# Patient Record
Sex: MALE | Race: WHITE | NOT HISPANIC OR LATINO | Employment: OTHER | URBAN - METROPOLITAN AREA
[De-identification: names, ages, dates, MRNs, and addresses within clinical notes are randomized per-mention and may not be internally consistent; named-entity substitution may affect disease eponyms.]

---

## 2021-11-17 ENCOUNTER — TELEMEDICINE (OUTPATIENT)
Dept: FAMILY MEDICINE CLINIC | Facility: TELEHEALTH | Age: 31
End: 2021-11-17

## 2021-11-17 VITALS — OXYGEN SATURATION: 94 % | WEIGHT: 267 LBS | HEIGHT: 73 IN | BODY MASS INDEX: 35.39 KG/M2

## 2021-11-17 DIAGNOSIS — U07.1 COVID-19: Primary | ICD-10-CM

## 2021-11-17 PROBLEM — S06.9XAA TRAUMATIC BRAIN INJURY (HCC): Status: ACTIVE | Noted: 2021-11-17

## 2021-11-17 PROBLEM — H91.90 HEARING LOSS: Status: ACTIVE | Noted: 2021-11-17

## 2021-11-17 PROBLEM — H54.7 VISUAL IMPAIRMENT: Status: ACTIVE | Noted: 2021-11-17

## 2021-11-17 PROCEDURE — 99203 OFFICE O/P NEW LOW 30 MIN: CPT | Performed by: NURSE PRACTITIONER

## 2021-11-17 RX ORDER — BUPRENORPHINE HYDROCHLORIDE 8 MG/1
TABLET SUBLINGUAL
COMMUNITY
Start: 2018-10-17

## 2021-11-17 NOTE — PATIENT INSTRUCTIONS
I know you have had a positive COVID-19 test, but will need a positive PCR test for the monoclonal antibodies.   I have included a COVID-19 test. Go to your nearest [facility] for testing. Tell them you have already been seen virtually and only need testing   We will notify you of your COVID-19 results by phone. You will receive a call from an unknown or blocked number. You can also get your results on your [employer].  You will need to remain quarantined for 10 days from onset of symptoms and only to discontinue if symptoms have improved and no fever for 24 hours without the use of fever reducing medications such as Tylenol (acetaminophen), Advil (ibuprfen), or Aleve (naproxen).  Continue to wear a mask for 14 days or until symptoms resolve. If symptoms worsen, go to Urgent Care or Emergency Department for care.   Symptoms of Coronavirus are treated just as you would for any viral illness. Take Tylenol and/or Ibuprofen for pain and/or fever, you may find it better to alternate these every 4 hours, so that you are only taking each every 8 hours. Stay hydrated, rest and you may treat cough with over-the-counter cough syrup such as Robitussin.  If your symptoms do not improve, symptoms change or do not fully resolve, seek further evaluation with your primary care provider or Urgent Care. If you develop severe symptoms, such as chest pain, high fever, difficulty breathing, difficulty urinating or have any symptoms that interfere with your ability to function go to the nearest Emergency Department immediately.   3 Key Steps to Take While Waiting for Your COVID-19 Test Result  To help stop the spread of COVID-19, take these 3 key steps NOW while waiting for your test results:  1. Stay home and monitor your health.  Stay home and monitor your health to help protect your friends, family, and others from possibly getting COVID-19 from you.  Stay home and away from others:  · If possible, stay away from  others, especially people who are at higher risk for getting very sick from COVID-19, such as older adults and people with other medical conditions.  · If you have been in contact with someone with COVID-19, stay home and away from others for 14 days after your last contact with that person. Follow the recommendations of your local public health department if you need to quarantine.  · If you have a fever, cough or other symptoms of COVID-19, stay home and away from others (except to get medical care).  Monitor your health:  · Watch for fever, cough, shortness of breath, or other symptoms of COVID-19. Remember, symptoms may appear 2-14 days after exposure to COVID-19 and can include:  ? Fever or chills  ? Cough  ? Shortness of breath or difficulty breathing  ? Tiredness  ? Muscle or body aches  ? Headache  ? New loss of taste or smell  ? Sore throat  ? Congestion or runny nose  ? Nausea or vomiting  ? Diarrhea  2. Think about the people you have recently been around.  If you are diagnosed with COVID-19, a public health worker may call you to check on your health, discuss who you have been around, and ask where you spent time while you may have been able to spread COVID-19 to others. While you wait for your COVID-19 test result, think about everyone you have been around recently. This will be important information to give health workers if your test is positive.   Complete the information on the back of this page to help you remember everyone you have been around.   3. Answer the phone call from the health department.  If a public health worker calls you, answer the call to help slow the spread of COVID-19 in your community.  · Discussions with health department staff are confidential. This means that your personal and medical information will be kept private and only shared with those who may need to know, like your health care provider.  · Your name will not be shared with those you came in contact with. The health  department will only notify people you were in close contact with (within 6 feet for more than 15 minutes) that they might have been exposed to COVID-19.  Think about the people you have recently been around  If you test positive and are diagnosed with COVID-19, someone from the health department may call to check-in on your health, discuss who you have been around, and ask where you spent time while you may have been able to spread COVID-19 to others. This form can help you think about people you have recently been around so you will be ready if a public health worker calls you.  Things to think about. Have you:  · Gone to work or school?  · Gotten together with others (eaten out at a restaurant, gone out for drinks, exercised with others or gone to a gym, had friends or family over to your house, volunteered, gone to a party, pool, or park)?  · Gone to a store in person (e.g., grocery store, mall)?  · Gone to in-person appointments (e.g., salon, russo, doctor's or dentist's office)?  · Ridden in a car with others (e.g., [employer]) or taken public transportation?  · Been inside a Hinduism, Baptism, Anabaptist or other places of Spiritism?  Who lives with you?  · ______________________________________________________________________  · ______________________________________________________________________  · ______________________________________________________________________  · ______________________________________________________________________  Who have you been around (less than 6 feet for a total of 15 minutes or more) in the last 10 days? (You may have more people to list than the space provided. If so, write on the front of this sheet or a separate piece of paper.)  Name ______________________________________________  · Phone number ____________________________________  · Date you last saw them _____________________________  · Where you last saw them ________________________________________________  Name  ______________________________________________  · Phone number ____________________________________  · Date you last saw them _____________________________  · Where you last saw them ________________________________________________  Name ______________________________________________  · Phone number ____________________________________  · Date you last saw them _____________________________  · Where you last saw them ________________________________________________  Name ______________________________________________  · Phone number ____________________________________  · Date you last saw them _____________________________  · Where you last saw them ________________________________________________  Name ______________________________________________  · Phone number ____________________________________  · Date you last saw them _____________________________  · Where you last saw them ________________________________________________  What have you done in the last 10 days with other people?  Activity _____________________________________________  · Location _________________________________________  · Date ____________________________________________  Activity _____________________________________________  · Location _________________________________________  · Date ____________________________________________  Activity _____________________________________________  · Location _________________________________________  · Date ____________________________________________  Activity _____________________________________________  · Location _________________________________________  · Date ____________________________________________  Activity _____________________________________________  · Location _________________________________________  · Date ____________________________________________  Centers for Disease Control and Prevention  cdc.gov/coronavirus  07/09/2021  This information is not intended to replace advice  given to you by your health care provider. Make sure you discuss any questions you have with your health care provider.  Document Revised: 07/14/2021 Document Reviewed: 07/14/2021  Elsevier Patient Education © 2021 [employer]

## 2021-11-17 NOTE — PROGRESS NOTES
"You have chosen to receive care through a telehealth visit.  Do you consent to use a video/audio connection for your medical care today? Yes     CHIEF COMPLAINT  Chief Complaint   Patient presents with   • Cough         HPI  Olayinka Matias is a 31 y.o. male  presents with complaint of positive COVID-19 symptoms and requesting monoclonal antibody infusion. He has several members of his family with symptoms. His wife is giving me the history with his permission.   Symptoms started on Monday 11/15.      Review of Systems   Constitutional: Positive for fatigue.   HENT: Positive for congestion, rhinorrhea, sinus pressure and sneezing.    Respiratory: Positive for cough and shortness of breath (mild). Negative for wheezing.    Cardiovascular: Positive for chest pain (soreness from coughing).   Gastrointestinal: Positive for nausea and vomiting. Negative for diarrhea.   Musculoskeletal: Positive for myalgias.   Neurological: Positive for headaches.       Past Medical History:   Diagnosis Date   • Obesity        No family history on file.    Social History     Socioeconomic History   • Marital status:    Tobacco Use   • Smoking status: Former Smoker   • Smokeless tobacco: Never Used   Vaping Use   • Vaping Use: Some days   • Substances: Nicotine         Ht 185.4 cm (73\")   Wt 121 kg (267 lb)   SpO2 94%   BMI 35.23 kg/m²     PHYSICAL EXAM  Physical Exam   Constitutional: He is oriented to person, place, and time. He appears well-developed and well-nourished. He does not have a sickly appearance. He does not appear ill. No distress.   HENT:   Head: Normocephalic and atraumatic.   Eyes: EOM are normal.   Neck: Neck normal appearance.  Pulmonary/Chest: Effort normal.  No respiratory distress.  Neurological: He is alert and oriented to person, place, and time.   Skin: Skin is dry.   Psychiatric: He has a normal mood and affect.       No results found for this or any previous visit.    Diagnoses and all orders for this " visit:    1. COVID-19 (Primary)  -     COVID-19,[facility] ROUTINE, NP/OP SWAB IN TRANSPORT MEDIA OR ESWAB 72 HR TAT - Swab, Nasopharynx; Future  -     QUESTIONNAIRE SERIES  -     QUESTIONNAIRE SERIES    Called wife for copy of test and he has not had pcr test. His mother did a home test on him. Will schedule COVID-19 pcr test for today.   He would like to be set up for monoclonal antibodies if pcr test is positive.     FOLLOW-UP  As discussed during visit with PCP/[facility] if no improvement or Urgent Care/Emergency Department if worsening of symptoms    Patient verbalizes understanding of medication dosage, comfort measures, instructions for treatment and follow-up.    Salome Brooks, CATHY  11/17/2021  15:15 EST    This visit was performed via Telehealth.  This patient has been instructed to follow-up with their primary care provider if their symptoms worsen or the treatment provided does not resolve their illness.

## 2021-11-19 ENCOUNTER — TRANSCRIBE ORDERS (OUTPATIENT)
Dept: ADMINISTRATIVE | Facility: HOSPITAL | Age: 31
End: 2021-11-19

## 2021-11-19 DIAGNOSIS — U07.1 CLINICAL DIAGNOSIS OF SEVERE ACUTE RESPIRATORY SYNDROME CORONAVIRUS 2 (SARS-COV-2) DISEASE: Primary | ICD-10-CM

## 2021-11-19 RX ORDER — DIPHENHYDRAMINE HYDROCHLORIDE 50 MG/ML
50 INJECTION INTRAMUSCULAR; INTRAVENOUS ONCE AS NEEDED
Status: CANCELLED | OUTPATIENT
Start: 2021-11-20

## 2021-11-19 RX ORDER — METHYLPREDNISOLONE SODIUM SUCCINATE 125 MG/2ML
125 INJECTION, POWDER, LYOPHILIZED, FOR SOLUTION INTRAMUSCULAR; INTRAVENOUS AS NEEDED
Status: CANCELLED | OUTPATIENT
Start: 2021-11-20

## 2021-11-19 RX ORDER — DIPHENHYDRAMINE HCL 50 MG
50 CAPSULE ORAL ONCE AS NEEDED
Status: CANCELLED | OUTPATIENT
Start: 2021-11-20

## 2021-11-19 RX ORDER — SODIUM CHLORIDE 9 MG/ML
30 INJECTION, SOLUTION INTRAVENOUS ONCE
Status: CANCELLED | OUTPATIENT
Start: 2021-11-19

## 2021-11-19 RX ORDER — METHYLPREDNISOLONE SODIUM SUCCINATE 125 MG/2ML
125 INJECTION, POWDER, LYOPHILIZED, FOR SOLUTION INTRAMUSCULAR; INTRAVENOUS AS NEEDED
Status: CANCELLED | OUTPATIENT
Start: 2021-11-19

## 2021-11-19 RX ORDER — SODIUM CHLORIDE 9 MG/ML
30 INJECTION, SOLUTION INTRAVENOUS ONCE
Status: CANCELLED | OUTPATIENT
Start: 2021-11-20

## 2021-11-19 RX ORDER — EPINEPHRINE 1 MG/ML
0.3 INJECTION, SOLUTION, CONCENTRATE INTRAVENOUS AS NEEDED
Status: CANCELLED | OUTPATIENT
Start: 2021-11-19

## 2021-11-19 RX ORDER — EPINEPHRINE 1 MG/ML
0.3 INJECTION, SOLUTION, CONCENTRATE INTRAVENOUS AS NEEDED
Status: CANCELLED | OUTPATIENT
Start: 2021-11-20

## 2021-11-20 ENCOUNTER — HOSPITAL ENCOUNTER (OUTPATIENT)
Dept: INFUSION THERAPY | Facility: HOSPITAL | Age: 31
Discharge: HOME | End: 2021-11-20
Admitting: NURSE PRACTITIONER

## 2021-11-20 VITALS
TEMPERATURE: 96.9 F | HEART RATE: 67 BPM | DIASTOLIC BLOOD PRESSURE: 71 MMHG | SYSTOLIC BLOOD PRESSURE: 107 MMHG | OXYGEN SATURATION: 95 % | RESPIRATION RATE: 16 BRPM

## 2021-11-20 DIAGNOSIS — U07.1 COVID-19: Primary | ICD-10-CM

## 2021-11-20 PROCEDURE — M0243 CASIRIVI AND IMDEVI INFUSION: HCPCS | Performed by: NURSE PRACTITIONER

## 2021-11-20 PROCEDURE — 96365 THER/PROPH/DIAG IV INF INIT: CPT

## 2021-11-20 PROCEDURE — 25010000002 INJECTION, CASIRIVIMAB AND IMDEVIMAB, 1200 MG: Performed by: NURSE PRACTITIONER

## 2021-11-20 RX ORDER — SODIUM CHLORIDE 9 MG/ML
30 INJECTION, SOLUTION INTRAVENOUS ONCE
Status: COMPLETED | OUTPATIENT
Start: 2021-11-20 | End: 2021-11-20

## 2021-11-20 RX ORDER — DIPHENHYDRAMINE HYDROCHLORIDE 50 MG/ML
50 INJECTION INTRAMUSCULAR; INTRAVENOUS ONCE AS NEEDED
Status: CANCELLED | OUTPATIENT
Start: 2021-11-20

## 2021-11-20 RX ORDER — DIPHENHYDRAMINE HYDROCHLORIDE 50 MG/ML
50 INJECTION INTRAMUSCULAR; INTRAVENOUS ONCE AS NEEDED
Status: DISCONTINUED | OUTPATIENT
Start: 2021-11-20 | End: 2021-11-20

## 2021-11-20 RX ORDER — DIPHENHYDRAMINE HCL 25 MG
50 CAPSULE ORAL ONCE AS NEEDED
Status: CANCELLED | OUTPATIENT
Start: 2021-11-20

## 2021-11-20 RX ORDER — METHYLPREDNISOLONE SODIUM SUCCINATE 125 MG/2ML
125 INJECTION, POWDER, LYOPHILIZED, FOR SOLUTION INTRAMUSCULAR; INTRAVENOUS AS NEEDED
Status: DISCONTINUED | OUTPATIENT
Start: 2021-11-20 | End: 2021-11-22 | Stop reason: HOSPADM

## 2021-11-20 RX ORDER — SODIUM CHLORIDE 9 MG/ML
30 INJECTION, SOLUTION INTRAVENOUS ONCE
Status: CANCELLED | OUTPATIENT
Start: 2021-11-20

## 2021-11-20 RX ORDER — DIPHENHYDRAMINE HCL 25 MG
50 CAPSULE ORAL ONCE AS NEEDED
Status: DISCONTINUED | OUTPATIENT
Start: 2021-11-20 | End: 2021-11-20

## 2021-11-20 RX ORDER — METHYLPREDNISOLONE SODIUM SUCCINATE 125 MG/2ML
125 INJECTION, POWDER, LYOPHILIZED, FOR SOLUTION INTRAMUSCULAR; INTRAVENOUS AS NEEDED
Status: CANCELLED | OUTPATIENT
Start: 2021-11-20

## 2021-11-20 RX ADMIN — IMDEVIMAB: 1332 INJECTION, SOLUTION, CONCENTRATE INTRAVENOUS at 08:19

## 2021-11-20 RX ADMIN — SODIUM CHLORIDE 30 ML: 9 INJECTION, SOLUTION INTRAVENOUS at 08:20

## 2021-11-20 NOTE — PROGRESS NOTES
Patient provided with Fact Sheet for Patients, Parents and Caregivers Emergency Use Authorization (EUA) of Casirivimab / Imdevimab for Coronavirus Disease 2019 (COVID-19) form.    Reviewed and patient verbalized understanding.  Appropriate PPE worn during the care of the patient.  Advised patient not to receive Covid vaccine for 90 days.    Patient tolerated without difficulty as evidence by stable vital signs throughout and patient denies s/s of mild/severe reaction. Patient discharged via wheelchair